# Patient Record
Sex: MALE | Race: WHITE
[De-identification: names, ages, dates, MRNs, and addresses within clinical notes are randomized per-mention and may not be internally consistent; named-entity substitution may affect disease eponyms.]

---

## 2022-08-03 ENCOUNTER — HOSPITAL ENCOUNTER (INPATIENT)
Dept: HOSPITAL 12 - ER | Age: 72
LOS: 3 days | Discharge: HOME | DRG: 177 | End: 2022-08-06
Payer: MEDICARE

## 2022-08-03 VITALS — BODY MASS INDEX: 22.22 KG/M2 | HEIGHT: 69 IN | WEIGHT: 150 LBS

## 2022-08-03 DIAGNOSIS — D69.6: ICD-10-CM

## 2022-08-03 DIAGNOSIS — J98.11: ICD-10-CM

## 2022-08-03 DIAGNOSIS — I13.0: ICD-10-CM

## 2022-08-03 DIAGNOSIS — J20.8: ICD-10-CM

## 2022-08-03 DIAGNOSIS — N40.0: ICD-10-CM

## 2022-08-03 DIAGNOSIS — Z87.891: ICD-10-CM

## 2022-08-03 DIAGNOSIS — U07.1: Primary | ICD-10-CM

## 2022-08-03 DIAGNOSIS — I21.A1: ICD-10-CM

## 2022-08-03 DIAGNOSIS — I27.20: ICD-10-CM

## 2022-08-03 DIAGNOSIS — Z95.0: ICD-10-CM

## 2022-08-03 DIAGNOSIS — J96.01: ICD-10-CM

## 2022-08-03 DIAGNOSIS — N17.0: ICD-10-CM

## 2022-08-03 DIAGNOSIS — E11.22: ICD-10-CM

## 2022-08-03 DIAGNOSIS — N18.9: ICD-10-CM

## 2022-08-03 DIAGNOSIS — J90: ICD-10-CM

## 2022-08-03 DIAGNOSIS — E87.2: ICD-10-CM

## 2022-08-03 DIAGNOSIS — I25.5: ICD-10-CM

## 2022-08-03 DIAGNOSIS — I50.23: ICD-10-CM

## 2022-08-03 DIAGNOSIS — Z95.5: ICD-10-CM

## 2022-08-03 DIAGNOSIS — I25.2: ICD-10-CM

## 2022-08-03 DIAGNOSIS — I25.10: ICD-10-CM

## 2022-08-03 LAB
BUN SERPL-MCNC: 30 MG/DL (ref 7–18)
CHLORIDE SERPL-SCNC: 96 MMOL/L (ref 98–107)
CO2 SERPL-SCNC: 26 MMOL/L (ref 21–32)
CREAT SERPL-MCNC: 1.7 MG/DL (ref 0.6–1.3)
GLUCOSE SERPL-MCNC: 183 MG/DL (ref 74–106)
HCT VFR BLD AUTO: 45.2 % (ref 36.7–47.1)
MCH RBC QN AUTO: 30.8 UUG (ref 23.8–33.4)
MCV RBC AUTO: 89.7 FL (ref 73–96.2)
PLATELET # BLD AUTO: 105 K/UL (ref 152–348)
POTASSIUM SERPL-SCNC: 4.4 MMOL/L (ref 3.5–5.1)

## 2022-08-03 PROCEDURE — A4663 DIALYSIS BLOOD PRESSURE CUFF: HCPCS

## 2022-08-03 PROCEDURE — G0378 HOSPITAL OBSERVATION PER HR: HCPCS

## 2022-08-04 VITALS — SYSTOLIC BLOOD PRESSURE: 116 MMHG | DIASTOLIC BLOOD PRESSURE: 63 MMHG

## 2022-08-04 VITALS — SYSTOLIC BLOOD PRESSURE: 118 MMHG | DIASTOLIC BLOOD PRESSURE: 81 MMHG

## 2022-08-04 VITALS — DIASTOLIC BLOOD PRESSURE: 71 MMHG | SYSTOLIC BLOOD PRESSURE: 113 MMHG

## 2022-08-04 VITALS — SYSTOLIC BLOOD PRESSURE: 111 MMHG | DIASTOLIC BLOOD PRESSURE: 72 MMHG

## 2022-08-04 LAB
ALP SERPL-CCNC: 114 U/L (ref 50–136)
ALT SERPL W/O P-5'-P-CCNC: 26 U/L (ref 16–63)
APPEARANCE UR: CLEAR
AST SERPL-CCNC: 36 U/L (ref 15–37)
BASE EXCESS BLDA CALC-SCNC: -1.5 MMOL/L
BILIRUB DIRECT SERPL-MCNC: 0.4 MG/DL (ref 0–0.2)
BILIRUB SERPL-MCNC: 1.3 MG/DL (ref 0.2–1)
BILIRUB UR QL STRIP: NEGATIVE
COLOR UR: YELLOW
DEPRECATED SQUAMOUS URNS QL MICRO: (no result) /HPF
FERRITIN SERPL-MCNC: 332 NG/ML (ref 26–388)
GLUCOSE UR STRIP-MCNC: NEGATIVE MG/DL
HCO3 BLDA-SCNC: 21.8 MMOL/L
HGB BLDA OXIMETRY-MCNC: 14.8 G/DL (ref 13.5–18)
HGB UR QL STRIP: NEGATIVE
INHALED O2 CONCENTRATION: 32 %
INHALED O2 FLOW RATE: 3 L/MIN (ref 0–30)
KETONES UR STRIP-MCNC: NEGATIVE MG/DL
LDH SERPL L TO P-CCNC: 279 U/L (ref 85–227)
LEUKOCYTE ESTERASE UR QL STRIP: NEGATIVE
NITRITE UR QL STRIP: NEGATIVE
PCO2 TEMP ADJ BLDA: 32.9 MMHG (ref 35–45)
PH TEMP ADJ BLDA: 7.44 [PH] (ref 7.35–7.45)
PH UR STRIP: 5.5 [PH] (ref 5–8)
PO2 TEMP ADJ BLDA: 61.1 MMHG (ref 75–100)
RBC #/AREA URNS HPF: (no result) /HPF (ref 0–3)
SP GR UR STRIP: 1.02 (ref 1–1.03)
SPECIMEN DRAWN FROM PATIENT: (no result)
UROBILINOGEN UR STRIP-MCNC: 0.2 E.U./DL
WBC #/AREA URNS HPF: (no result) /HPF
WBC #/AREA URNS HPF: (no result) /HPF (ref 0–3)
WS STN SPEC: 6.8 G/DL (ref 6.4–8.2)

## 2022-08-04 RX ADMIN — Medication SCH EA: at 17:46

## 2022-08-04 RX ADMIN — DEXAMETHASONE SODIUM PHOSPHATE SCH MG: 4 INJECTION, SOLUTION INTRAMUSCULAR; INTRAVENOUS at 09:06

## 2022-08-04 RX ADMIN — TAMSULOSIN HYDROCHLORIDE SCH MG: 0.4 CAPSULE ORAL at 09:07

## 2022-08-04 RX ADMIN — FLUTICASONE FUROATE AND VILANTEROL TRIFENATATE SCH EACH: 100; 25 POWDER RESPIRATORY (INHALATION) at 11:08

## 2022-08-04 RX ADMIN — DIGOXIN SCH MCG: 125 TABLET ORAL at 17:46

## 2022-08-04 RX ADMIN — METOPROLOL SUCCINATE SCH MG: 50 TABLET, FILM COATED, EXTENDED RELEASE ORAL at 09:08

## 2022-08-04 RX ADMIN — ENOXAPARIN SODIUM SCH MG: 80 INJECTION SUBCUTANEOUS at 11:11

## 2022-08-04 RX ADMIN — TAMSULOSIN HYDROCHLORIDE SCH MG: 0.4 CAPSULE ORAL at 21:10

## 2022-08-04 RX ADMIN — FUROSEMIDE SCH MG: 10 INJECTION, SOLUTION INTRAMUSCULAR; INTRAVENOUS at 17:46

## 2022-08-04 RX ADMIN — DEXTROSE SCH MLS/HR: 50 INJECTION, SOLUTION INTRAVENOUS at 21:15

## 2022-08-04 RX ADMIN — Medication SCH EA: at 11:08

## 2022-08-04 NOTE — NUR
ADMITTED FROM HOME VIA ER A 71 YO MALE WITH CC OF SOB, ADM DX COVID 19 POSITIVE. TRANSFERRED 
VIA WHEELCHAIR WITH O2 AT 3L NC SATURATING 95%. DENIES CHEST PAIN, NO SIGNS OF ACUTE 
DISTRESS. INITIAL ASSESSMENT INITIATED. ADMISSION ORDERS NOTED.

## 2022-08-05 VITALS — SYSTOLIC BLOOD PRESSURE: 115 MMHG | DIASTOLIC BLOOD PRESSURE: 74 MMHG

## 2022-08-05 VITALS — DIASTOLIC BLOOD PRESSURE: 90 MMHG | SYSTOLIC BLOOD PRESSURE: 138 MMHG

## 2022-08-05 VITALS — SYSTOLIC BLOOD PRESSURE: 108 MMHG | DIASTOLIC BLOOD PRESSURE: 74 MMHG

## 2022-08-05 VITALS — DIASTOLIC BLOOD PRESSURE: 69 MMHG | SYSTOLIC BLOOD PRESSURE: 124 MMHG

## 2022-08-05 LAB
ALP SERPL-CCNC: 74 U/L (ref 50–136)
ALT SERPL W/O P-5'-P-CCNC: 24 U/L (ref 16–63)
AST SERPL-CCNC: 48 U/L (ref 15–37)
BILIRUB SERPL-MCNC: 0.9 MG/DL (ref 0.2–1)
BUN SERPL-MCNC: 49 MG/DL (ref 7–18)
CHLORIDE SERPL-SCNC: 98 MMOL/L (ref 98–107)
CO2 SERPL-SCNC: 27 MMOL/L (ref 21–32)
CREAT SERPL-MCNC: 1.4 MG/DL (ref 0.6–1.3)
GLUCOSE SERPL-MCNC: 127 MG/DL (ref 74–106)
HCT VFR BLD AUTO: 38.3 % (ref 36.7–47.1)
MCH RBC QN AUTO: 30.3 UUG (ref 23.8–33.4)
MCV RBC AUTO: 87.6 FL (ref 73–96.2)
PHOSPHATE SERPL-MCNC: 5 MG/DL (ref 2.5–4.9)
PLATELET # BLD AUTO: 77 K/UL (ref 152–348)
POTASSIUM SERPL-SCNC: 3.6 MMOL/L (ref 3.5–5.1)
WS STN SPEC: 5.4 G/DL (ref 6.4–8.2)

## 2022-08-05 PROCEDURE — XW033E5 INTRODUCTION OF REMDESIVIR ANTI-INFECTIVE INTO PERIPHERAL VEIN, PERCUTANEOUS APPROACH, NEW TECHNOLOGY GROUP 5: ICD-10-PCS | Performed by: INTERNAL MEDICINE

## 2022-08-05 RX ADMIN — DEXAMETHASONE SODIUM PHOSPHATE SCH MG: 4 INJECTION, SOLUTION INTRAMUSCULAR; INTRAVENOUS at 08:17

## 2022-08-05 RX ADMIN — ASPIRIN SCH MG: 81 TABLET, CHEWABLE ORAL at 08:37

## 2022-08-05 RX ADMIN — FUROSEMIDE SCH MG: 10 INJECTION, SOLUTION INTRAMUSCULAR; INTRAVENOUS at 16:35

## 2022-08-05 RX ADMIN — METOPROLOL SUCCINATE SCH MG: 50 TABLET, FILM COATED, EXTENDED RELEASE ORAL at 08:16

## 2022-08-05 RX ADMIN — Medication SCH MCG: at 06:28

## 2022-08-05 RX ADMIN — Medication SCH EA: at 08:15

## 2022-08-05 RX ADMIN — FUROSEMIDE SCH MG: 10 INJECTION, SOLUTION INTRAMUSCULAR; INTRAVENOUS at 08:19

## 2022-08-05 RX ADMIN — DEXTROSE SCH MLS/HR: 50 INJECTION, SOLUTION INTRAVENOUS at 21:16

## 2022-08-05 RX ADMIN — TAMSULOSIN HYDROCHLORIDE SCH MG: 0.4 CAPSULE ORAL at 21:14

## 2022-08-05 RX ADMIN — ENOXAPARIN SODIUM SCH MG: 80 INJECTION SUBCUTANEOUS at 21:00

## 2022-08-05 RX ADMIN — ENOXAPARIN SODIUM SCH MG: 80 INJECTION SUBCUTANEOUS at 00:47

## 2022-08-05 RX ADMIN — DEXTROSE SCH MLS/HR: 5 SOLUTION INTRAVENOUS at 00:47

## 2022-08-05 RX ADMIN — Medication SCH EA: at 16:36

## 2022-08-05 RX ADMIN — TAMSULOSIN HYDROCHLORIDE SCH MG: 0.4 CAPSULE ORAL at 08:08

## 2022-08-05 RX ADMIN — FLUTICASONE FUROATE AND VILANTEROL TRIFENATATE SCH EACH: 100; 25 POWDER RESPIRATORY (INHALATION) at 08:16

## 2022-08-05 RX ADMIN — DEXTROSE SCH MLS/HR: 5 SOLUTION INTRAVENOUS at 22:32

## 2022-08-05 RX ADMIN — DIGOXIN SCH MCG: 125 TABLET ORAL at 08:15

## 2022-08-05 RX ADMIN — ENOXAPARIN SODIUM SCH MG: 80 INJECTION SUBCUTANEOUS at 08:22

## 2022-08-05 NOTE — NUR
Received patient on bed, awake, on oxygen support at 1L via nasal  cannula, not in labored 
breathing. Ambulatory, no complaint of pain. Safety precautions provided, call light within 
reach.

## 2022-08-05 NOTE — NUR
Patient tolerated care well throughout shift with no complaints of pain during shift.  IV 
site patent and intact.  Bed left in lowest position with call light within reach.  Comfort 
measures provided.  Will endorse information to PM nurse.

## 2022-08-06 VITALS — SYSTOLIC BLOOD PRESSURE: 121 MMHG | DIASTOLIC BLOOD PRESSURE: 78 MMHG

## 2022-08-06 VITALS — DIASTOLIC BLOOD PRESSURE: 71 MMHG | SYSTOLIC BLOOD PRESSURE: 112 MMHG

## 2022-08-06 VITALS — SYSTOLIC BLOOD PRESSURE: 126 MMHG | DIASTOLIC BLOOD PRESSURE: 80 MMHG

## 2022-08-06 LAB
BUN SERPL-MCNC: 49 MG/DL (ref 7–18)
CHLORIDE SERPL-SCNC: 96 MMOL/L (ref 98–107)
CO2 SERPL-SCNC: 31 MMOL/L (ref 21–32)
CREAT SERPL-MCNC: 1.5 MG/DL (ref 0.6–1.3)
GLUCOSE SERPL-MCNC: 121 MG/DL (ref 74–106)
HCT VFR BLD AUTO: 40.6 % (ref 36.7–47.1)
LDH SERPL L TO P-CCNC: 302 U/L (ref 85–227)
LYMPHOCYTES NFR BLD MANUAL: 6 % (ref 20–40)
MAGNESIUM SERPL-MCNC: 2.2 MG/DL (ref 1.8–2.4)
MCH RBC QN AUTO: 30.6 UUG (ref 23.8–33.4)
MCV RBC AUTO: 88.6 FL (ref 73–96.2)
MONOCYTES NFR BLD MANUAL: 2 % (ref 2–10)
NEUTS BAND NFR BLD MANUAL: 1 % (ref 0–10)
NEUTS SEG NFR BLD MANUAL: 91 % (ref 42–75)
PHOSPHATE SERPL-MCNC: 4.9 MG/DL (ref 2.5–4.9)
PLATELET # BLD AUTO: 81 K/UL (ref 152–348)
POTASSIUM SERPL-SCNC: 3.4 MMOL/L (ref 3.5–5.1)

## 2022-08-06 RX ADMIN — FLUTICASONE FUROATE AND VILANTEROL TRIFENATATE SCH EACH: 100; 25 POWDER RESPIRATORY (INHALATION) at 09:12

## 2022-08-06 RX ADMIN — DEXAMETHASONE SODIUM PHOSPHATE SCH MG: 4 INJECTION, SOLUTION INTRAMUSCULAR; INTRAVENOUS at 08:34

## 2022-08-06 RX ADMIN — METOPROLOL SUCCINATE SCH MG: 50 TABLET, FILM COATED, EXTENDED RELEASE ORAL at 08:43

## 2022-08-06 RX ADMIN — Medication SCH EA: at 08:44

## 2022-08-06 RX ADMIN — Medication SCH MCG: at 06:23

## 2022-08-06 RX ADMIN — FUROSEMIDE SCH MG: 10 INJECTION, SOLUTION INTRAMUSCULAR; INTRAVENOUS at 08:33

## 2022-08-06 RX ADMIN — DIGOXIN SCH MCG: 125 TABLET ORAL at 08:44

## 2022-08-06 RX ADMIN — TAMSULOSIN HYDROCHLORIDE SCH MG: 0.4 CAPSULE ORAL at 08:33

## 2022-08-06 RX ADMIN — ASPIRIN SCH MG: 81 TABLET, CHEWABLE ORAL at 08:33

## 2022-08-06 RX ADMIN — ENOXAPARIN SODIUM SCH MG: 80 INJECTION SUBCUTANEOUS at 08:45

## 2022-08-06 NOTE — NUR
Received pt in bed, no distress, no pain, took all routine medications. No symptoms of 
Covid-19 noted. Patient feels good , he said. No SOB, O2 sat 95

## 2022-08-06 NOTE — NUR
Preparing patient for discharge. No pain, no SOB, stable condition at discharge. Vitals WNL, 
O2 sat 95 on RA. Waiting for Covid-19 rapid test results before he goes home.  
Jane informed the patient and myself that oxygen tank will be delivered to the patient's 
home because he came to the hospital hypoxic on RA, so the patient said that it will be 
better for him to have oxygen tank at home just in case, he feels more comfortable having it 
at home.

## 2022-08-06 NOTE — NUR
Slept well, on oxygen 1L via nasal cannula saturating at 96%, ambulating independently going 
to the bathroom. Patient for sputum culture, instruction given, according to the patient he 
is not having a productive cough. MRSA sent to lab as ordered. For continuity of care.